# Patient Record
Sex: FEMALE | URBAN - METROPOLITAN AREA
[De-identification: names, ages, dates, MRNs, and addresses within clinical notes are randomized per-mention and may not be internally consistent; named-entity substitution may affect disease eponyms.]

---

## 2017-02-02 ENCOUNTER — OFFICE VISIT (OUTPATIENT)
Dept: URGENT CARE | Facility: PHYSICIAN GROUP | Age: 32
End: 2017-02-02
Payer: COMMERCIAL

## 2017-02-02 ENCOUNTER — HOSPITAL ENCOUNTER (OUTPATIENT)
Dept: RADIOLOGY | Facility: MEDICAL CENTER | Age: 32
End: 2017-02-02
Attending: FAMILY MEDICINE
Payer: COMMERCIAL

## 2017-02-02 VITALS
HEART RATE: 70 BPM | TEMPERATURE: 99.5 F | DIASTOLIC BLOOD PRESSURE: 80 MMHG | RESPIRATION RATE: 16 BRPM | HEIGHT: 60 IN | WEIGHT: 158 LBS | SYSTOLIC BLOOD PRESSURE: 122 MMHG | BODY MASS INDEX: 31.02 KG/M2 | OXYGEN SATURATION: 97 %

## 2017-02-02 DIAGNOSIS — R07.81 RIB PAIN ON LEFT SIDE: ICD-10-CM

## 2017-02-02 DIAGNOSIS — S22.32XA CLOSED FRACTURE OF ONE RIB OF LEFT SIDE, INITIAL ENCOUNTER: ICD-10-CM

## 2017-02-02 PROCEDURE — 71101 X-RAY EXAM UNILAT RIBS/CHEST: CPT | Mod: LT

## 2017-02-02 PROCEDURE — 99203 OFFICE O/P NEW LOW 30 MIN: CPT | Performed by: FAMILY MEDICINE

## 2017-02-02 RX ORDER — TRAMADOL HYDROCHLORIDE 50 MG/1
50 TABLET ORAL EVERY 6 HOURS PRN
Qty: 28 TAB | Refills: 0 | Status: SHIPPED | OUTPATIENT
Start: 2017-02-02 | End: 2017-06-14

## 2017-02-02 ASSESSMENT — ENCOUNTER SYMPTOMS
NUMBNESS: 0
HEADACHES: 0
LEG PAIN: 0
TINGLING: 0
WEAKNESS: 0
BACK PAIN: 1
ABDOMINAL PAIN: 0
CHILLS: 0
NAUSEA: 0
FEVER: 0
BOWEL INCONTINENCE: 0
PARESIS: 0

## 2017-02-02 NOTE — MR AVS SNAPSHOT
Koki Matt   2017 6:00 PM   Office Visit   MRN: [id number]    Department:  [facility]   Dept Phone:  [phone]    Description:  Female : 1985   Provider:  Maroc Arana M.D.           Reason for Visit     Back Pain L side rib/back pain. fell x5 days ago      Allergies as of 2017     No Known Allergies      You were diagnosed with     Rib pain on left side   [[id number]]         Vital Signs     Blood Pressure Pulse Temperature Respirations Height Weight    122/80 mmHg 70 37.5 °C (99.5 °F) 16 1.524 m (5') 71.668 kg (158 lb)    Body Mass Index Oxygen Saturation Smoking Status             30.86 kg/m2 97% Never Smoker          Basic Information     Date Of Birth Sex Race Ethnicity Preferred Language    1985 Female Unable to Obtain Unknown English      Health Maintenance     Patient has no pending health maintenance at this time      Current Immunizations     No immunizations on file.      Below and/or attached are the medications your provider expects you to take. Review all of your home medications and newly ordered medications with your provider and/or pharmacist. Follow medication instructions as directed by your provider and/or pharmacist. Please keep your medication list with you and share with your provider. Update the information when medications are discontinued, doses are changed, or new medications (including over-the-counter products) are added; and carry medication information at all times in the event of emergency situations     Allergies:  No Known Allergies          Medications  Valid as of: 2017 -  7:16 PM    Generic Name Brand Name Tablet Size Instructions for use    TraMADol HCl (Tab) ULTRAM 50 MG Take 1 Tab by mouth every 6 hours as needed for Moderate Pain.        .                 Medicines prescribed today were sent to:     [facility] [id number] [facility], [place] - [address] [address]    Phone: [phone]  Fax: [phone]    Open 24 Hours?: No      Medication refill instructions:       If your prescription bottle indicates you have medication refills left, it is not necessary to call your provider’s office. Please contact your pharmacy and they will refill your medication.    If your prescription bottle indicates you do not have any refills left, you may request refills at any time through one of the following ways: The online Carista App system (except Urgent Care), by calling your provider’s office, or by asking your pharmacy to contact your provider’s office with a refill request. Medication refills are processed only during regular business hours and may not be available until the next business day. Your provider may request additional information or to have a follow-up visit with you prior to refilling your medication.   *Please Note: Medication refills are assigned a new Rx number when refilled electronically. Your pharmacy may indicate that no refills were authorized even though a new prescription for the same medication is available at the pharmacy. Please request the medicine by name with the pharmacy before contacting your provider for a refill.        Your To Do List     Future Labs/Procedures Complete By Expires    [id number] (WITH 1-VIEW CXR) LEFT  As directed 2/2/2018         Carista App Access Code: [email or web address]  Expires: 3/4/2017  7:16 PM    Carista App  A secure, online tool to manage your health information     [employer]’s Carista App® is a secure, online tool that connects you to your personalized health information from the privacy of your home -- day or night - making it very easy for you to manage your healthcare. Once the activation process is completed, you can even access your medical information using the Carista App rg, which is available for free in the Apple Rg store or Google Play store.     Carista App provides the following levels of access (as shown below):   My Chart Features   [facility]  Primary Care Doctor [facility]  Specialists [facility]  Urgent  Care Non-[facility]  Primary Care  Doctor   Email your healthcare team securely and privately 24/7 X X X    Manage appointments: schedule your next appointment; view details of past/upcoming appointments X      Request prescription refills. X      View recent personal medical records, including lab and immunizations X X X X   View health record, including health history, allergies, medications X X X X   Read reports about your outpatient visits, procedures, consult and ER notes X X X X   See your discharge summary, which is a recap of your hospital and/or ER visit that includes your diagnosis, lab results, and care plan. X X       How to register for [employer]:  1. Go to  [email or web address].  2. Click on the Sign Up Now box, which takes you to the New Member Sign Up page. You will need to provide the following information:  a. Enter your [employer] Access Code exactly as it appears at the top of this page. (You will not need to use this code after you’ve completed the sign-up process. If you do not sign up before the expiration date, you must request a new code.)   b. Enter your date of birth.   c. Enter your home email address.   d. Click Submit, and follow the next screen’s instructions.  3. Create a [employer] ID. This will be your [employer] login ID and cannot be changed, so think of one that is secure and easy to remember.  4. Create a [employer] password. You can change your password at any time.  5. Enter your Password Reset Question and Answer. This can be used at a later time if you forget your password.   6. Enter your e-mail address. This allows you to receive e-mail notifications when new information is available in [employer].  7. Click Sign Up. You can now view your health information.    For assistance activating your [employer] account, call [phone]

## 2017-02-03 NOTE — PROGRESS NOTES
Subjective:      Koki Gutierrez is a 31 y.o. female who presents with Back Pain            Back Pain  This is a new problem. The current episode started in the past 7 days (4-5 days ago). The problem occurs constantly. The problem has been gradually worsening since onset. Pain location: thoracic. The quality of the pain is described as stabbing. Radiates to: to center of back. The pain is at a severity of 8/10. The pain is severe. The pain is the same all the time. The symptoms are aggravated by lying down. Stiffness is present in the morning. Pertinent negatives include no abdominal pain, bladder incontinence, bowel incontinence, chest pain, dysuria, fever, headaches, leg pain, numbness, paresis, tingling or weakness. Risk factors include recent trauma. Treatments tried: percocet and oxycodone, which she had from prior delivery of her child.       Review of Systems   Constitutional: Negative for fever and chills.   Cardiovascular: Negative for chest pain.   Gastrointestinal: Negative for nausea, abdominal pain and bowel incontinence.   Genitourinary: Negative for bladder incontinence and dysuria.   Musculoskeletal: Positive for back pain.   Neurological: Negative for tingling, weakness, numbness and headaches.     PMH:  has no past medical history on file.  MEDS: No current outpatient prescriptions on file.  ALLERGIES: No Known Allergies  SURGHX:   Past Surgical History   Procedure Laterality Date   • Breast implant revision       augmentation     SOCHX:  reports that she has never smoked. She does not have any smokeless tobacco history on file. She reports that she drinks alcohol. She reports that she does not use illicit drugs.  FH: Family history was reviewed, no pertinent findings to report      Objective:     /80 mmHg  Pulse 70  Temp(Src) 37.5 °C (99.5 °F)  Resp 16  Ht 1.524 m (5')  Wt 71.668 kg (158 lb)  BMI 30.86 kg/m2  SpO2 97%     Physical Exam   Constitutional: She appears well-developed. No  distress.   HENT:   Head: Normocephalic.   Cardiovascular: Normal rate, regular rhythm and normal heart sounds.  Exam reveals no friction rub.    No murmur heard.  Pulmonary/Chest: Effort normal. No respiratory distress. She has no wheezes.   Abdominal: Soft. She exhibits no distension. There is no tenderness. There is no rebound and no guarding.   No organomegaly   Neurological: She is alert.   Skin: Skin is dry. No rash noted. She is not diaphoretic.   Psychiatric: She has a normal mood and affect. Her behavior is normal.   left rib chest tenderness            Assessment/Plan:     1. Rib pain on left side  [id number] (WITH 1-VIEW CXR) LEFT    tramadol (ULTRAM) 50 MG Tab   2. Closed fracture of one rib of left side, initial encounter           There is an acute left posterolateral sixth rib fracture with absent with depression    No other fracture is seen    No pneumothorax, pulmonary contusion or hemothorax is demonstrated      Follow-up if symptoms worsen or fail to improve

## 2017-03-21 ENCOUNTER — HOSPITAL ENCOUNTER (OUTPATIENT)
Dept: RADIOLOGY | Facility: MEDICAL CENTER | Age: 32
End: 2017-03-21
Attending: OBSTETRICS & GYNECOLOGY
Payer: COMMERCIAL

## 2017-03-21 DIAGNOSIS — N64.4 BREAST PAIN: ICD-10-CM

## 2017-03-21 PROCEDURE — G0204 DX MAMMO INCL CAD BI: HCPCS

## 2017-04-03 ENCOUNTER — OFFICE VISIT (OUTPATIENT)
Dept: URGENT CARE | Facility: PHYSICIAN GROUP | Age: 32
End: 2017-04-03
Payer: COMMERCIAL

## 2017-04-03 VITALS
DIASTOLIC BLOOD PRESSURE: 84 MMHG | TEMPERATURE: 99.4 F | SYSTOLIC BLOOD PRESSURE: 108 MMHG | RESPIRATION RATE: 16 BRPM | HEART RATE: 90 BPM | OXYGEN SATURATION: 96 %

## 2017-04-03 DIAGNOSIS — S61.219A FINGER LACERATION, INITIAL ENCOUNTER: ICD-10-CM

## 2017-04-03 PROCEDURE — 12001 RPR S/N/AX/GEN/TRNK 2.5CM/<: CPT | Performed by: EMERGENCY MEDICINE

## 2017-04-03 ASSESSMENT — ENCOUNTER SYMPTOMS
FOCAL WEAKNESS: 0
CHILLS: 0
EYE DISCHARGE: 0
SENSORY CHANGE: 0
COUGH: 0
ABDOMINAL PAIN: 0
VOMITING: 0
EYE REDNESS: 0
NAUSEA: 0
FEVER: 0

## 2017-04-03 NOTE — MR AVS SNAPSHOT
Koki Gutierrez   4/3/2017 7:00 PM   Office Visit   MRN: [id number]    Department:  [facility]   Dept Phone:  [phone]    Description:  Female : 1985   Provider:  ANNAMARIE Santiago M.D.           Reason for Visit     Finger Injury Rt ring finger cut on glass happened around 4:40PM today      Allergies as of 4/3/2017     No Known Allergies      Vital Signs     Blood Pressure Pulse Temperature Respirations Oxygen Saturation Smoking Status    108/84 mmHg 90 37.4 °C (99.4 °F) 16 96% Never Smoker       Basic Information     Date Of Birth Sex Race Ethnicity Preferred Language    1985 Female Unable to Obtain Unknown English      Health Maintenance        Date Due Completion Dates    IMM DTaP/Tdap/Td Vaccine (1 - Tdap) 3/10/2005 ---    PAP SMEAR 3/10/2007 ---            Current Immunizations     No immunizations on file.      Below and/or attached are the medications your provider expects you to take. Review all of your home medications and newly ordered medications with your provider and/or pharmacist. Follow medication instructions as directed by your provider and/or pharmacist. Please keep your medication list with you and share with your provider. Update the information when medications are discontinued, doses are changed, or new medications (including over-the-counter products) are added; and carry medication information at all times in the event of emergency situations     Allergies:  No Known Allergies          Medications  Valid as of: 2017 -  7:27 PM    Generic Name Brand Name Tablet Size Instructions for use    TraMADol HCl (Tab) ULTRAM 50 MG Take 1 Tab by mouth every 6 hours as needed for Moderate Pain.        .                 Medicines prescribed today were sent to:     [facility] [id number] [facility], [place] - [address] [address]    Phone: [phone] Fax: [phone]    Open 24 Hours?: No      Medication refill instructions:      If your prescription bottle indicates you have medication refills left, it is not necessary to call your provider’s office. Please contact your pharmacy and they will refill your medication.    If your prescription bottle indicates you do not have any refills left, you may request refills at any time through one of the following ways: The online Becual system (except Urgent Care), by calling your provider’s office, or by asking your pharmacy to contact your provider’s office with a refill request. Medication refills are processed only during regular business hours and may not be available until the next business day. Your provider may request additional information or to have a follow-up visit with you prior to refilling your medication.   *Please Note: Medication refills are assigned a new Rx number when refilled electronically. Your pharmacy may indicate that no refills were authorized even though a new prescription for the same medication is available at the pharmacy. Please request the medicine by name with the pharmacy before contacting your provider for a refill.           Becual Access Code: [email or web address]  Expires: 5/3/2017  7:27 PM    Becual  A secure, online tool to manage your health information     [employer]’s Becual® is a secure, online tool that connects you to your personalized health information from the privacy of your home -- day or night - making it very easy for you to manage your healthcare. Once the activation process is completed, you can even access your medical information using the Becual rg, which is available for free in the Apple Rg store or Google Play store.     Becual provides the following levels of access (as shown below):   My Chart Features   [facility] Primary Care Doctor [facility]  Specialists [facility]  Urgent  Care [facility]  Primary Care  Doctor   Email your healthcare team securely and privately 24/7 X X X    Manage appointments: schedule your next appointment; view  details of past/upcoming appointments X      Request prescription refills. X      View recent personal medical records, including lab and immunizations X X X X   View health record, including health history, allergies, medications X X X X   Read reports about your outpatient visits, procedures, consult and ER notes X X X X   See your discharge summary, which is a recap of your hospital and/or ER visit that includes your diagnosis, lab results, and care plan. X X       How to register for [employer]:  1. Go to  [email or web address].  2. Click on the Sign Up Now box, which takes you to the New Member Sign Up page. You will need to provide the following information:  a. Enter your [employer] Access Code exactly as it appears at the top of this page. (You will not need to use this code after you’ve completed the sign-up process. If you do not sign up before the expiration date, you must request a new code.)   b. Enter your date of birth.   c. Enter your home email address.   d. Click Submit, and follow the next screen’s instructions.  3. Create a [employer] ID. This will be your [employer] login ID and cannot be changed, so think of one that is secure and easy to remember.  4. Create a [employer] password. You can change your password at any time.  5. Enter your Password Reset Question and Answer. This can be used at a later time if you forget your password.   6. Enter your e-mail address. This allows you to receive e-mail notifications when new information is available in [employer].  7. Click Sign Up. You can now view your health information.    For assistance activating your [employer] account, call [phone]

## 2017-04-04 NOTE — PROGRESS NOTES
Subjective:      Koki Gutierrez is a 32 y.o. female who presents with Finger Injury            Hand Injury  This is a new problem. The current episode started today (patient cut the tip of her right ring finger on a glass at the [employer] that was broken. Patient does not recall her last tetanus immunization but did have a child 3 years ago at [facility].). Pertinent negatives include no abdominal pain, chest pain, chills, coughing, fever, nausea, rash or vomiting.   No Known Allergies   Social History     Social History   • Marital Status: Unknown     Spouse Name: N/A   • Number of Children: N/A   • Years of Education: N/A     Occupational History   • Not on file.     Social History Main Topics   • Smoking status: Never Smoker    • Smokeless tobacco: Not on file   • Alcohol Use: Yes      Comment: 7-10 drinks once per month   • Drug Use: No   • Sexual Activity: Not on file     Other Topics Concern   • Not on file     Social History Narrative   No past medical history on file.   Current Outpatient Prescriptions on File Prior to Visit   Medication Sig Dispense Refill   • tramadol (ULTRAM) 50 MG Tab Take 1 Tab by mouth every 6 hours as needed for Moderate Pain. 28 Tab 0     No current facility-administered medications on file prior to visit.   No family history on file.    Review of Systems   Constitutional: Negative for fever and chills.   Eyes: Negative for discharge and redness.   Respiratory: Negative for cough.    Cardiovascular: Negative for chest pain.   Gastrointestinal: Negative for nausea, vomiting and abdominal pain.   Musculoskeletal: Negative for joint pain.   Skin: Negative for itching and rash.        Patient is an 8 mm laceration to the tip of her right ring finger. No signs of any foreign body no cellulitis or lymphangitis. This laceration only occurred within the past hour to hour and a half.   Neurological: Negative for sensory change and focal weakness.          Objective:     /84  mmHg  Pulse 90  Temp(Src) 37.4 °C (99.4 °F)  Resp 16  SpO2 96%     Physical Exam   Constitutional: She appears well-developed and well-nourished.   HENT:   Head: Normocephalic and atraumatic.   Right Ear: External ear normal.   Left Ear: External ear normal.   Eyes: Right eye exhibits no discharge.   Cardiovascular: Normal rate.    Pulmonary/Chest: Effort normal and breath sounds normal.   Musculoskeletal: She exhibits tenderness.   Patient has a laceration, 8 cm to the tip of her right ring finger   Vitals reviewed.            Procedure I explained the procedure to the patient and she agreed. The wound was cleaned and dried and superglue was placed over the 8 mm laceration to the tip of her right ring finger  Assessment/Plan:     Diagnosis laceration right ring finger    .  Patient will keep the finger dry she's been given a number of close to protect the finger while she is working to watch for infection and return if necessary. State immunization files were checked and patient had a T [id number]

## 2017-04-05 ENCOUNTER — OFFICE VISIT (OUTPATIENT)
Dept: URGENT CARE | Facility: PHYSICIAN GROUP | Age: 32
End: 2017-04-05
Payer: COMMERCIAL

## 2017-04-05 VITALS
SYSTOLIC BLOOD PRESSURE: 118 MMHG | HEART RATE: 81 BPM | TEMPERATURE: 99.1 F | DIASTOLIC BLOOD PRESSURE: 86 MMHG | WEIGHT: 152 LBS | OXYGEN SATURATION: 97 % | BODY MASS INDEX: 29.69 KG/M2

## 2017-04-05 DIAGNOSIS — Z51.89 VISIT FOR WOUND CHECK: ICD-10-CM

## 2017-04-05 DIAGNOSIS — S61.214A LACERATION OF RIGHT RING FINGER: ICD-10-CM

## 2017-04-05 ASSESSMENT — ENCOUNTER SYMPTOMS
SENSORY CHANGE: 0
FEVER: 0
FOCAL WEAKNESS: 0

## 2017-04-05 NOTE — MR AVS SNAPSHOT
Koki Charlotte   2017 10:30 AM   Office Visit   MRN: [id number]    Department:  [facility]   Dept Phone:  [phone]    Description:  Female : 1985   Provider:  Olegario Nick M.D.           Reason for Visit     Finger Injury ring finger right hand, seen , started bleeding again      Allergies as of 2017     No Known Allergies      You were diagnosed with     Visit for wound check   [[id number]]       Laceration of right ring finger   [[id number]]         Vital Signs     Blood Pressure Pulse Temperature Weight Oxygen Saturation Smoking Status    118/86 mmHg 81 37.3 °C (99.1 °F) 68.947 kg (152 lb) 97% Never Smoker       Basic Information     Date Of Birth Sex Race Ethnicity Preferred Language    1985 Female Unable to Obtain Unknown English      Health Maintenance        Date Due Completion Dates    IMM DTaP/Tdap/Td Vaccine (1 - Tdap) 3/10/2005 ---    PAP SMEAR 3/10/2007 ---            Current Immunizations     No immunizations on file.      Below and/or attached are the medications your provider expects you to take. Review all of your home medications and newly ordered medications with your provider and/or pharmacist. Follow medication instructions as directed by your provider and/or pharmacist. Please keep your medication list with you and share with your provider. Update the information when medications are discontinued, doses are changed, or new medications (including over-the-counter products) are added; and carry medication information at all times in the event of emergency situations     Allergies:  No Known Allergies          Medications  Valid as of: 2017 - 11:43 AM    Generic Name Brand Name Tablet Size Instructions for use    TraMADol HCl (Tab) ULTRAM 50 MG Take 1 Tab by mouth every 6 hours as needed for Moderate Pain.        .                 Medicines prescribed today were sent to:     [id number] - [place] - [address] [address]  [facility] [address]    Phone: [phone] Fax: [phone]    Open 24 Hours?: No      Medication refill instructions:       If your prescription bottle indicates you have medication refills left, it is not necessary to call your provider’s office. Please contact your pharmacy and they will refill your medication.    If your prescription bottle indicates you do not have any refills left, you may request refills at any time through one of the following ways: The online [employer] system (except Urgent Care), by calling your provider’s office, or by asking your pharmacy to contact your provider’s office with a refill request. Medication refills are processed only during regular business hours and may not be available until the next business day. Your provider may request additional information or to have a follow-up visit with you prior to refilling your medication.   *Please Note: Medication refills are assigned a new Rx number when refilled electronically. Your pharmacy may indicate that no refills were authorized even though a new prescription for the same medication is available at the pharmacy. Please request the medicine by name with the pharmacy before contacting your provider for a refill.        Instructions    Leave the initial dressing in place, clean and dry, for the next 24 hours.  Allow the applied Steri-strip to fall off themselves in the next several days.  You may wash the area, but avoid scrubbing the area.  Allow the strips to dry before covering the area if needed.  Cover with clean dressing, such as Telfa pad, and hold in place with tape or rolled gauze.  Recheck with physician promptly if any increasing pain, worsening redness, swelling or discharge at the site.  Wound Check  If you have a wound, it may take some time to heal. Eventually, a scar will form. The scar will also fade with time. It is important to take care of your wound while it is healing. This helps to protect your wound  from infection.   HOW SHOULD I TAKE CARE OF MY WOUND AT HOME?  · Some wounds are allowed to close on their own or are repaired at a later date. There are many different ways to close and cover a wound, including stitches (sutures), skin glue, and adhesive strips. Follow your health care provider's instructions about:  ¨ Wound care.  ¨ Bandage (dressing) changes and removal.  ¨ Wound closure removal.  · Take medicines only as directed by your health care provider.  · Keep all follow-up visits as directed by your health care provider. This is important.  · Do not take baths, swim, or use a hot tub until your health care provider approves. You may shower as directed by your health care provider.  · Keep your wound clean and dry.  WHAT AFFECTS SCAR FORMATION?  Scars affect each person differently. How your body scars depends on:  · The location and size of your wound.  · Traits that you inherited from your parents (genetic predisposition).  · How you take care of your wound. Irritation and inflammation increase the amount of scar formation.  · Sun exposure. This can darken a scar.  WHEN SHOULD I CALL OR SEE MY HEALTH CARE PROVIDER?  Call or see your health care provider if:  · You have redness, swelling, or pain at your wound site.  · You have fluid, blood, or pus coming from your wound.  · You have muscle aches, chills, or a general ill feeling.  · You notice a bad smell coming from the wound.  · Your wound separates after the sutures, staples, or skin adhesive strips have been removed.  · You have persistent nausea or vomiting.  · You have a fever.  · You are dizzy.  WHEN SHOULD I CALL 911 OR GO TO THE EMERGENCY ROOM?  Call 911 or go to the emergency room if:  · You faint.  · You have difficulty breathing.     This information is not intended to replace advice given to you by your health care provider. Make sure you discuss any questions you have with your health care provider.     Document Released: 09/23/2005  Document Revised: 01/08/2016 Document Reviewed: 09/29/2015  [employer] Interactive Patient Education ©2016 Elsevier Inc.            [employer] Access Code: [email or web address]  Expires: 5/3/2017  7:27 PM    [employer]  A secure, online tool to manage your health information     Full[employer]acts [employer]® is a secure, online tool that connects you to your personalized health information from the privacy of your home -- day or night - making it very easy for you to manage your healthcare. Once the activation process is completed, you can even access your medical information using the [employer] rg, which is available for free in the Apple Rg store or Google Play store.     [employer] provides the following levels of access (as shown below):   My Chart Features   [facility] Doctor [facility]  Urgent  Care Non-[facility]  Primary Care  Doctor   Email your healthcare team securely and privately 24/7 X X X    Manage appointments: schedule your next appointment; view details of past/upcoming appointments X      Request prescription refills. X      View recent personal medical records, including lab and immunizations X X X X   View health record, including health history, allergies, medications X X X X   Read reports about your outpatient visits, procedures, consult and ER notes X X X X   See your discharge summary, which is a recap of your hospital and/or ER visit that includes your diagnosis, lab results, and care plan. X X       How to register for [employer]:  1. Go to  [email or web address].  2. Click on the Sign Up Now box, which takes you to the New Member Sign Up page. You will need to provide the following information:  a. Enter your [employer] Access Code exactly as it appears at the top of this page. (You will not need to use this code after you’ve completed the sign-up process. If you do not sign up before the expiration date, you must request a new code.)   b. Enter your date of birth.   c. Enter your home  email address.   d. Click Submit, and follow the next screen’s instructions.  3. Create a [employer] ID. This will be your [employer] login ID and cannot be changed, so think of one that is secure and easy to remember.  4. Create a [employer] password. You can change your password at any time.  5. Enter your Password Reset Question and Answer. This can be used at a later time if you forget your password.   6. Enter your e-mail address. This allows you to receive e-mail notifications when new information is available in [employer].  7. Click Sign Up. You can now view your health information.    For assistance activating your [employer] account, call [phone]

## 2017-04-05 NOTE — PROGRESS NOTES
Subjective:      Koki Gutierrez is a 32 y.o. female who presents with Finger Injury            Wound Check  Treated in ED: 2 days ago. Previous treatment included laceration repair. There has been bloody discharge from the wound. The pain has improved. She has no difficulty moving the affected extremity or digit.    patient notes that after removal of covering dressing tissue adhesive material was displaced and rebleeding occurred today. No other trauma    Review of Systems   Constitutional: Negative for fever.   Musculoskeletal:        No pain proximal to the site.   Skin: Negative for rash.   Neurological: Negative for sensory change and focal weakness.     PMH:  has no past medical history on file.  MEDS:   Current outpatient prescriptions:   •  tramadol (ULTRAM) 50 MG Tab, Take 1 Tab by mouth every 6 hours as needed for Moderate Pain., Disp: 28 Tab, Rfl: 0  ALLERGIES: No Known Allergies  SURGHX:   Past Surgical History   Procedure Laterality Date   • Breast implant revision       augmentation   • Pr enlarge breast with implant       SOCHX:  reports that she has never smoked. She does not have any smokeless tobacco history on file. She reports that she drinks alcohol. She reports that she does not use illicit drugs.  FH: family history is not on file.         Objective:     /86 mmHg  Pulse 81  Temp(Src) 37.3 °C (99.1 °F)  Wt 68.947 kg (152 lb)  SpO2 97%     Physical Exam   Constitutional: She appears well-developed and well-nourished. No distress.   Cardiovascular:   Capillary refill is normal.   Lymphadenopathy:   No lymphangitis.   Neurological:   Distal sensation to light touch and pressure intact.   Skin: Skin is warm and dry.   Partial to full-thickness linear less than 1 cm laceration right distal radial ring finger. No foreign body, no evidence of secondary infection.   Psychiatric: She has a normal mood and affect.               Assessment/Plan:     1. Visit for wound check    2. Laceration of  right ring finger  Will provide support to the site with Steri-Strip, not reclosure.

## 2017-04-05 NOTE — PATIENT INSTRUCTIONS
Leave the initial dressing in place, clean and dry, for the next 24 hours.  Allow the applied Steri-strip to fall off themselves in the next several days.  You may wash the area, but avoid scrubbing the area.  Allow the strips to dry before covering the area if needed.  Cover with clean dressing, such as Telfa pad, and hold in place with tape or rolled gauze.  Recheck with physician promptly if any increasing pain, worsening redness, swelling or discharge at the site.  Wound Check  If you have a wound, it may take some time to heal. Eventually, a scar will form. The scar will also fade with time. It is important to take care of your wound while it is healing. This helps to protect your wound from infection.   HOW SHOULD I TAKE CARE OF MY WOUND AT HOME?  · Some wounds are allowed to close on their own or are repaired at a later date. There are many different ways to close and cover a wound, including stitches (sutures), skin glue, and adhesive strips. Follow your health care provider's instructions about:  ¨ Wound care.  ¨ Bandage (dressing) changes and removal.  ¨ Wound closure removal.  · Take medicines only as directed by your health care provider.  · Keep all follow-up visits as directed by your health care provider. This is important.  · Do not take baths, swim, or use a hot tub until your health care provider approves. You may shower as directed by your health care provider.  · Keep your wound clean and dry.  WHAT AFFECTS SCAR FORMATION?  Scars affect each person differently. How your body scars depends on:  · The location and size of your wound.  · Traits that you inherited from your parents (genetic predisposition).  · How you take care of your wound. Irritation and inflammation increase the amount of scar formation.  · Sun exposure. This can darken a scar.  WHEN SHOULD I CALL OR SEE MY HEALTH CARE PROVIDER?  Call or see your health care provider if:  · You have redness, swelling, or pain at your wound  site.  · You have fluid, blood, or pus coming from your wound.  · You have muscle aches, chills, or a general ill feeling.  · You notice a bad smell coming from the wound.  · Your wound separates after the sutures, staples, or skin adhesive strips have been removed.  · You have persistent nausea or vomiting.  · You have a fever.  · You are dizzy.  WHEN SHOULD I CALL 911 OR GO TO THE EMERGENCY ROOM?  Call 911 or go to the emergency room if:  · You faint.  · You have difficulty breathing.     This information is not intended to replace advice given to you by your health care provider. Make sure you discuss any questions you have with your health care provider.     Document Released: 09/23/2005 Document Revised: 01/08/2016 Document Reviewed: 09/29/2015  [employer] Interactive Patient Education ©2016 Elsevier Inc.

## 2017-06-14 ENCOUNTER — OFFICE VISIT (OUTPATIENT)
Dept: MEDICAL GROUP | Facility: PHYSICIAN GROUP | Age: 32
End: 2017-06-14
Payer: COMMERCIAL

## 2017-06-14 VITALS
WEIGHT: 152 LBS | BODY MASS INDEX: 29.69 KG/M2 | TEMPERATURE: 98.4 F | DIASTOLIC BLOOD PRESSURE: 60 MMHG | OXYGEN SATURATION: 94 % | RESPIRATION RATE: 16 BRPM | HEART RATE: 76 BPM | SYSTOLIC BLOOD PRESSURE: 100 MMHG

## 2017-06-14 DIAGNOSIS — M54.6 ACUTE MIDLINE THORACIC BACK PAIN: Primary | ICD-10-CM

## 2017-06-14 DIAGNOSIS — Z00.00 ROUTINE GENERAL MEDICAL EXAMINATION AT A HEALTH CARE FACILITY: ICD-10-CM

## 2017-06-14 PROCEDURE — 99214 OFFICE O/P EST MOD 30 MIN: CPT | Performed by: INTERNAL MEDICINE

## 2017-06-14 RX ORDER — HYDROCODONE BITARTRATE AND ACETAMINOPHEN 5; 325 MG/1; MG/1
1-2 TABLET ORAL EVERY 8 HOURS PRN
Qty: 20 TAB | Refills: 0 | Status: SHIPPED | OUTPATIENT
Start: 2017-06-14

## 2017-06-14 RX ORDER — CARISOPRODOL 350 MG/1
350 TABLET ORAL EVERY 8 HOURS PRN
Qty: 30 TAB | Refills: 0 | Status: SHIPPED | OUTPATIENT
Start: 2017-06-14

## 2017-06-14 NOTE — PROGRESS NOTES
Subjective:   Koki Gutierrez is a 32 y.o. female here today for back pain     Acute midline thoracic back pain  Suspected Brenda 10, 2017 while she was lifting her one year-old son. She states she was not lifting him properly. She noticed a popping sound while she was lifting him. Since then she started having midline, thoracic spine pain going down to lumbar spine and seems to radiate into her stomach. She is trying Tylenol but pain is significant. She has had rib fractures before and seems like this pain is the same as when she had rib fractures. Squamous pain as cramping, sharp shooting pain. She denies fever, abdominal pain, nausea, diarrhea, chest pain, shortness of breath, leg swellings, dysuria, urinary or fecal incontinence. She tried some stretching exercises but still the pain is not getting better         Current medicines (including changes today)  Current Outpatient Prescriptions   Medication Sig Dispense Refill   • carisoprodol (SOMA) 350 MG Tab Take 1 Tab by mouth every 8 hours as needed for Muscle Spasms. 30 Tab 0   • hydrocodone-acetaminophen (NORCO) 5-325 MG Tab per tablet Take 1-2 Tabs by mouth every 8 hours as needed. 20 Tab 0     No current facility-administered medications for this visit.     She  has no past medical history on file.    Current Outpatient Prescriptions   Medication Sig Dispense Refill   • carisoprodol (SOMA) 350 MG Tab Take 1 Tab by mouth every 8 hours as needed for Muscle Spasms. 30 Tab 0   • hydrocodone-acetaminophen (NORCO) 5-325 MG Tab per tablet Take 1-2 Tabs by mouth every 8 hours as needed. 20 Tab 0     No current facility-administered medications for this visit.       Allergies as of 06/14/2017   • (No Known Allergies)       Social History     Social History   • Marital Status: Unknown     Spouse Name: N/A   • Number of Children: N/A   • Years of Education: N/A     Occupational History   • Not on file.     Social History Main Topics   • Smoking status: Never Smoker    •  Smokeless tobacco: Not on file   • Alcohol Use: Yes      Comment: 7-10 drinks once per month   • Drug Use: No   • Sexual Activity: Not on file     Other Topics Concern   • Not on file     Social History Narrative        History reviewed. No pertinent family history.    Past Surgical History   Procedure Laterality Date   • Breast implant revision       augmentation   • Pr enlarge breast with implant         ROS   All systems reviewed are negative except for HPI       Objective:     Blood pressure 100/60, pulse 76, temperature 36.9 °C (98.4 °F), resp. rate 16, SpO2 94 %. There is no weight on file to calculate BMI.   Physical Exam:  Constitutional: Alert, no distress.  Skin: Warm, dry, good turgor, no rashes in visible areas.  Eye: Equal, round and reactive, conjunctiva clear, lids normal.  ENMT: Lips without lesions, good dentition, oropharynx clear.  Neck: Trachea midline, no masses, no thyromegaly. No cervical or supraclavicular lymphadenopathy  Respiratory: Unlabored respiratory effort, lungs clear to auscultation, no wheezes, no ronchi.  Cardiovascular: Normal S1, S2, no murmur, no edema.  Abdomen: Soft, non-tender, no masses, no hepatosplenomegaly.  Psych: Alert and oriented x3, normal affect and mood.  SPINE: No significant spinal curvature on forward bend. Mild tenderness in paraspinous muscles lumbar spine with mild current spasm on the left side. SLT positive for right leg, 45*, left leg is doing better. DTR 2+ patella, 1+ achilles bilaterally. Strength 5/5 proximal and distal LE.  No pain with stress of SI. Full hip ROM. Poor hamstring flexibility. No symptoms with axial loading.         Assessment and Plan:   The following treatment plan was discussed    1. Acute midline thoracic back pain  muscleskeletal pain. Heat pads, norco and soma as needed  I explained to pt side effects as constipation, drowsiness (tramadol cause her hallucinations), falling, fracture, brain bleeds. Pt understands that this is just  for few days. I discuss with pt interactions with alcohol, marijuana, or other sedatives as benzos which mortality is almost 10 times more combining. I explained her she cannot drive and take these medications.   Advised to continue physical therapy, stretching exercise.   She refuses imaging at this time.   I advised pt to follow up if it is not getting better.   - REFERRAL TO PHYSICAL THERAPY Reason for Therapy: Eval/Treat/Report  - carisoprodol (SOMA) 350 MG Tab; Take 1 Tab by mouth every 8 hours as needed for Muscle Spasms.  Dispense: 30 Tab; Refill: 0  - hydrocodone-acetaminophen (NORCO) 5-325 MG Tab per tablet; Take 1-2 Tabs by mouth every 8 hours as needed.  Dispense: 20 Tab; Refill: 0    2. Routine general medical examination at a health care facility  She works as massage therapist. She is healthy, she has three children, she is not taking any meds   - COMP METABOLIC PANEL; Future  - LIPID PROFILE; Future      Followup: Return if symptoms worsen or fail to improve, for Short.

## 2017-06-14 NOTE — MR AVS SNAPSHOT
Koki Gutierrez   2017 7:40 AM   Office Visit   MRN: [id number]    Department:  [facility]   Dept Phone:  [phone]    Description:  Female : 1985   Provider:  Shameka Macedo M.D.           Reason for Visit     Back Pain Pulled back muscles last Friday      Allergies as of 2017     No Known Allergies      You were diagnosed with     Acute midline thoracic back pain   [0863088]  -  Primary     Routine general medical examination at a health care facility   [V70.0.ICD-9-CM]         Vital Signs     Blood Pressure Pulse Temperature Respirations Oxygen Saturation Smoking Status    100/60 mmHg 76 36.9 °C (98.4 °F) 16 94% Never Smoker       Basic Information     Date Of Birth Sex Race Ethnicity Preferred Language    1985 Female Unable to Obtain Unknown English      Problem List              ICD-10-CM Priority Class Noted - Resolved    Acute midline thoracic back pain M54.6   2017 - Present      Health Maintenance        Date Due Completion Dates    IMM DTaP/Tdap/Td Vaccine (1 - Tdap) 3/10/2005 ---    PAP SMEAR 3/10/2007 ---            Current Immunizations     No immunizations on file.      Below and/or attached are the medications your provider expects you to take. Review all of your home medications and newly ordered medications with your provider and/or pharmacist. Follow medication instructions as directed by your provider and/or pharmacist. Please keep your medication list with you and share with your provider. Update the information when medications are discontinued, doses are changed, or new medications (including over-the-counter products) are added; and carry medication information at all times in the event of emergency situations     Allergies:  No Known Allergies          Medications  Valid as of: 2017 -  8:25 AM    Generic Name Brand Name Tablet Size Instructions for use    Carisoprodol (Tab) SOMA 350 MG Take 1 Tab by mouth every 8 hours as needed for Muscle Spasms.          Hydrocodone-Acetaminophen (Tab) NORCO 5-325 MG Take 1-2 Tabs by mouth every 8 hours as needed.        .                 Medicines prescribed today were sent to:     [id number]  [place], [place] - [address] [place] [address]    Phone: [phone] Fax: [phone]    Open 24 Hours?: No      Medication refill instructions:       If your prescription bottle indicates you have medication refills left, it is not necessary to call your provider’s office. Please contact your pharmacy and they will refill your medication.    If your prescription bottle indicates you do not have any refills left, you may request refills at any time through one of the following ways: The online [employer] system (except Urgent Care), by calling your provider’s office, or by asking your pharmacy to contact your provider’s office with a refill request. Medication refills are processed only during regular business hours and may not be available until the next business day. Your provider may request additional information or to have a follow-up visit with you prior to refilling your medication.   *Please Note: Medication refills are assigned a new Rx number when refilled electronically. Your pharmacy may indicate that no refills were authorized even though a new prescription for the same medication is available at the pharmacy. Please request the medicine by name with the pharmacy before contacting your provider for a refill.        Your To Do List     Future Labs/Procedures Complete By Expires    COMP METABOLIC PANEL  As directed 6/15/2018    LIPID PROFILE  As directed 6/15/2018      Referral     A referral request has been sent to our patient care coordination department. Please allow 3-5 business days for us to process this request and contact you either by phone or mail. If you do not hear from us by the 5th business day, please call us at [phone].           [employer] Status: Patient  Declined

## 2017-06-14 NOTE — ASSESSMENT & PLAN NOTE
Suspected Brenda 10, 2017 while she was lifting her one year-old son. She states she was not lifting him properly. She noticed a popping sound while she was lifting him. Since then she started having midline, thoracic spine pain going down to lumbar spine and seems to radiate into her stomach. She is trying Tylenol but pain is significant. She has had rib fractures before and seems like this pain is the same as when she had rib fractures. Squamous pain as cramping, sharp shooting pain. She denies fever, abdominal pain, nausea, diarrhea, chest pain, shortness of breath, leg swellings, dysuria, urinary or fecal incontinence. She tried some stretching exercises but still the pain is not getting better